# Patient Record
Sex: MALE | Race: WHITE | NOT HISPANIC OR LATINO | Employment: UNEMPLOYED | URBAN - NONMETROPOLITAN AREA
[De-identification: names, ages, dates, MRNs, and addresses within clinical notes are randomized per-mention and may not be internally consistent; named-entity substitution may affect disease eponyms.]

---

## 2024-04-10 ENCOUNTER — HOSPITAL ENCOUNTER (EMERGENCY)
Facility: HOSPITAL | Age: 39
Discharge: HOME | End: 2024-04-10
Attending: EMERGENCY MEDICINE | Admitting: EMERGENCY MEDICINE
Payer: COMMERCIAL

## 2024-04-10 VITALS
RESPIRATION RATE: 16 BRPM | HEART RATE: 99 BPM | DIASTOLIC BLOOD PRESSURE: 93 MMHG | WEIGHT: 190 LBS | TEMPERATURE: 98.6 F | OXYGEN SATURATION: 99 % | SYSTOLIC BLOOD PRESSURE: 169 MMHG

## 2024-04-10 DIAGNOSIS — K02.9 PAIN DUE TO DENTAL CARIES: ICD-10-CM

## 2024-04-10 DIAGNOSIS — K04.7 DENTAL INFECTION: Primary | ICD-10-CM

## 2024-04-10 RX ORDER — CHLORHEXIDINE GLUCONATE ORAL RINSE 1.2 MG/ML
15 SOLUTION DENTAL 2 TIMES DAILY
COMMUNITY
Start: 2024-04-08 | End: 2024-04-22

## 2024-04-10 RX ORDER — OXYCODONE HYDROCHLORIDE AND ACETAMINOPHEN 5; 325 MG/1; MG/1
1 TABLET ORAL EVERY 4 HOURS PRN
Qty: 10 TABLET | Refills: 0 | Status: SHIPPED | OUTPATIENT
Start: 2024-04-10

## 2024-04-10 RX ORDER — OXYCODONE HYDROCHLORIDE AND ACETAMINOPHEN 5; 325 MG/1; MG/1
1 TABLET ORAL ONCE
Status: COMPLETED | OUTPATIENT
Start: 2024-04-10 | End: 2024-04-10

## 2024-04-10 RX ORDER — CLINDAMYCIN HYDROCHLORIDE 150 MG/1
300 CAPSULE ORAL ONCE
Status: COMPLETED | OUTPATIENT
Start: 2024-04-10 | End: 2024-04-10

## 2024-04-10 RX ORDER — AMOXICILLIN 875 MG/1
875 TABLET, COATED ORAL 2 TIMES DAILY
COMMUNITY
Start: 2024-04-08 | End: 2024-04-18

## 2024-04-10 RX ORDER — CLINDAMYCIN HYDROCHLORIDE 300 MG/1
300 CAPSULE ORAL EVERY 6 HOURS
Qty: 40 CAPSULE | Refills: 0 | Status: SHIPPED | OUTPATIENT
Start: 2024-04-10 | End: 2024-04-20

## 2024-04-10 RX ADMIN — OXYCODONE HYDROCHLORIDE AND ACETAMINOPHEN 1 TABLET: 5; 325 TABLET ORAL at 22:15

## 2024-04-10 RX ADMIN — CLINDAMYCIN HYDROCHLORIDE 300 MG: 150 CAPSULE ORAL at 22:15

## 2024-04-11 NOTE — ED PROVIDER NOTES
"History  Chief Complaint   Patient presents with    Dental Pain     Patient presents to the ED with complaints of mouth pain since Saturday. He was evaluated at urgent care on Monday and was placed on antibiotics. The patient states he has bad teeth, and is drinking iced tea from a gallon stating \"I need something cold in my mouth or I'm in extreme pain.\"      Patient is a 38-year-old male presents the emergency department complaining of dental pain left lower jaw started about 5 days ago was seen at urgent care 2 days ago and prescribed antibiotics amoxicillin still having severe pain despite taking Tylenol Motrin.  No fevers.        History provided by:  Patient      Prior to Admission Medications   Prescriptions Last Dose Informant Patient Reported? Taking?   amoxicillin (AMOXIL) 875 mg tablet   Yes Yes   Sig: Take 875 mg by mouth 2 (two) times a day   chlorhexidine (PERIDEX) 0.12 % solution   Yes Yes   Sig: Apply 15 mL to the mouth or throat 2 (two) times a day      Facility-Administered Medications: None       History reviewed. No pertinent past medical history.    History reviewed. No pertinent surgical history.    History reviewed. No pertinent family history.  I have reviewed and agree with the history as documented.    E-Cigarette/Vaping    E-Cigarette Use Never User      E-Cigarette/Vaping Substances     Social History     Tobacco Use    Smoking status: Every Day     Current packs/day: 1.00     Types: Cigarettes    Smokeless tobacco: Never   Vaping Use    Vaping status: Never Used   Substance Use Topics    Alcohol use: Never    Drug use: Yes     Types: Marijuana       Review of Systems   Constitutional:  Negative for fever.   HENT:  Positive for dental problem. Negative for congestion.    Respiratory:  Negative for shortness of breath.    Cardiovascular:  Negative for chest pain.   Gastrointestinal:  Negative for nausea and vomiting.   Neurological:  Negative for headaches.   All other systems reviewed and " are negative.      Physical Exam  Physical Exam  Vitals and nursing note reviewed.   Constitutional:       General: He is not in acute distress.     Appearance: Normal appearance.   HENT:      Head: Normocephalic and atraumatic.      Nose: Nose normal.      Mouth/Throat:      Dentition: Abnormal dentition. Dental tenderness, gingival swelling and dental caries present.   Eyes:      Conjunctiva/sclera: Conjunctivae normal.   Pulmonary:      Effort: Pulmonary effort is normal. No respiratory distress.   Skin:     General: Skin is dry.   Neurological:      General: No focal deficit present.      Mental Status: He is alert and oriented to person, place, and time.         Vital Signs  ED Triage Vitals [04/10/24 2154]   Temperature Pulse Respirations Blood Pressure SpO2   98.6 °F (37 °C) 99 16 169/93 99 %      Temp Source Heart Rate Source Patient Position - Orthostatic VS BP Location FiO2 (%)   Temporal Monitor Sitting Right leg --      Pain Score       10 - Worst Possible Pain           Vitals:    04/10/24 2154   BP: 169/93   Pulse: 99   Patient Position - Orthostatic VS: Sitting         Visual Acuity      ED Medications  Medications   clindamycin (CLEOCIN) capsule 300 mg (300 mg Oral Given 4/10/24 2215)   oxyCODONE-acetaminophen (PERCOCET) 5-325 mg per tablet 1 tablet (1 tablet Oral Given 4/10/24 2215)       Diagnostic Studies  Results Reviewed       None                   No orders to display              Procedures  Procedures         ED Course                               SBIRT 20yo+      Flowsheet Row Most Recent Value   Initial Alcohol Screen: US AUDIT-C     1. How often do you have a drink containing alcohol? 0 Filed at: 04/10/2024 2153   2. How many drinks containing alcohol do you have on a typical day you are drinking?  0 Filed at: 04/10/2024 2153   3a. Male UNDER 65: How often do you have five or more drinks on one occasion? 0 Filed at: 04/10/2024 2153   Audit-C Score 0 Filed at: 04/10/2024 2153   BRITNI: How  many times in the past year have you...    Used an illegal drug or used a prescription medication for non-medical reasons? Never Filed at: 04/10/2024 2153                      Medical Decision Making  Patient is afebrile nontoxic well-appearing clinically and hemodynamically stable in the emergency department history and examination consistent with multiple infected dental caries and developing left lower dental abscess for now we will recommend stronger antibiotic with clindamycin discontinue amoxicillin and pain control and provided with multiple dental referrals advised prompt follow-up with dentist for further evaluation and definitive treatment. return precautions and anticipatory guidance discussed.      Problems Addressed:  Dental infection: acute illness or injury  Pain due to dental caries: acute illness or injury    Risk  Prescription drug management.             Disposition  Final diagnoses:   Dental infection   Pain due to dental caries     Time reflects when diagnosis was documented in both MDM as applicable and the Disposition within this note       Time User Action Codes Description Comment    4/10/2024 10:13 PM Alfredo Bañuelos Add [K04.7] Dental infection     4/10/2024 10:13 PM Alfredo Bañuelos Add [K02.9] Pain due to dental caries           ED Disposition       ED Disposition   Discharge    Condition   Stable    Date/Time   Wed Apr 10, 2024 2212    Comment   Faraz Vera discharge to home/self care.                   Follow-up Information       Follow up With Specialties Details Why Contact Info Additional Information    [facility]  Schedule an appointment as soon as possible for a visit in 1 day  [address] [facility] [address]  [phone]     [facility] Schedule an appointment as soon as possible for a visit in 1 day  [address] [facility] [address] [phone] [facility]  [facility], [address] [place] [address], [id number] [phone]    [facility]  Schedule an appointment as soon as possible for a visit in 1 day  [address] [place] [address]  [phone]     [facility]  Schedule an appointment as soon as possible for a visit in 1 day  [address] [place] [address] [address]  [facility] [address]  [phone]             Discharge Medication List as of 4/10/2024 10:17 PM        START taking these medications    Details   clindamycin (CLEOCIN) 300 MG capsule Take 1 capsule (300 mg total) by mouth every 6 (six) hours for 10 days, Starting Wed 4/10/2024, Until Sat 4/20/2024, Normal      oxyCODONE-acetaminophen (PERCOCET) 5-325 mg per tablet Take 1 tablet by mouth every 4 (four) hours as needed for moderate pain for up to 10 doses Max Daily Amount: 6 tablets, Starting Wed 4/10/2024, Normal           CONTINUE these medications which have NOT CHANGED    Details   amoxicillin (AMOXIL) 875 mg tablet Take 875 mg by mouth 2 (two) times a day, Starting Mon 4/8/2024, Until Thu 4/18/2024, Historical Med      chlorhexidine (PERIDEX) 0.12 % solution Apply 15 mL to the mouth or throat 2 (two) times a day, Starting Mon 4/8/2024, Until Mon 4/22/2024, Historical Med             No discharge procedures on file.    PDMP Review       None            ED Provider  Electronically Signed by             Alfredo Bañuelos DO  04/10/24 2998

## 2024-04-11 NOTE — DISCHARGE INSTRUCTIONS
Here is a list of  dental clinics that may be able to help you.  Keep in mind that these clinics do not have to see you or any other patient.  Also, these clinics are not connected to the [facility] [place] or [facility] but if they agree to see you as a patient it is easy for them to call  Medical Records Department to have your records faxed to them.      [facility]:  [address] [facility]   [address]   [facility] [address]   [phone]     [facility]:   [address]   [address]   [place], [place] [address] [phone]     [facility]:  [address] [place] [address] [phone]    [facility]:  [address] [facility] [place] [address] [phone]    [facility]:  [address],  [place] [address] [phone]    [facility]:  [address] [place] [address]  [phone]    [facility]:  [address] [place] [address]  [phone]    [facility]:  [address] [id number] [phone]    [facility]:  [address] [place], [place] [address] [phone]    [facility]  [address], [place] [address]  [phone]    [facility]:  [address] [place] [id number] [phone]     [facility]:  [address] [place] [address] [phone]

## 2025-04-17 ENCOUNTER — HOSPITAL ENCOUNTER (EMERGENCY)
Facility: HOSPITAL | Age: 40
Discharge: HOME | End: 2025-04-17
Attending: EMERGENCY MEDICINE
Payer: COMMERCIAL

## 2025-04-17 VITALS
TEMPERATURE: 97.5 F | HEART RATE: 79 BPM | RESPIRATION RATE: 18 BRPM | DIASTOLIC BLOOD PRESSURE: 79 MMHG | OXYGEN SATURATION: 97 % | WEIGHT: 185 LBS | BODY MASS INDEX: 24.52 KG/M2 | HEIGHT: 73 IN | SYSTOLIC BLOOD PRESSURE: 136 MMHG

## 2025-04-17 DIAGNOSIS — K04.7 DENTAL ABSCESS: Primary | ICD-10-CM

## 2025-04-17 PROCEDURE — 99282 EMERGENCY DEPT VISIT SF MDM: CPT

## 2025-04-17 RX ORDER — CLINDAMYCIN HYDROCHLORIDE 300 MG/1
300 CAPSULE ORAL 3 TIMES DAILY
COMMUNITY
Start: 2025-04-13 | End: 2025-04-20

## 2025-04-17 RX ORDER — CITALOPRAM HYDROBROMIDE 40 MG/1
1 TABLET ORAL EVERY MORNING
COMMUNITY
Start: 2024-10-23

## 2025-04-17 RX ADMIN — TOPICAL ANESTHETIC 2 SPRAY: 200 SPRAY DENTAL; PERIODONTAL at 14:41

## 2025-04-17 NOTE — ED PROVIDER NOTES
ED Disposition       None          Assessment & Plan   {Hyperlinks  Risk Stratification - NIHSS - HEART SCORE - Fill out sepsis note and make sure you call [phone] if severe or septic shock:[id number]}    Medical Decision Making  Risk  OTC drugs.             Medications - No data to display    ED Risk Strat Scores                    No data recorded        SBIRT 20yo+      Flowsheet Row Most Recent Value   Initial Alcohol Screen: US AUDIT-C     1. How often do you have a drink containing alcohol? 0 Filed at: 04/17/2025 1350   2. How many drinks containing alcohol do you have on a typical day you are drinking?  0 Filed at: 04/17/2025 1350   3a. Male UNDER 65: How often do you have five or more drinks on one occasion? 0 Filed at: 04/17/2025 1350   Audit-C Score 0 Filed at: 04/17/2025 1350   BRITNI: How many times in the past year have you...    Used an illegal drug or used a prescription medication for non-medical reasons? Never Filed at: 04/17/2025 1350                            History of Present Illness   {Hyperlinks  History (Med, Surg, Fam, Social) - Current Medications - Allergies  :[id number]}    Chief Complaint   Patient presents with   • Dental Pain     Patient states that he was seen at  on Sunday and prescribed Clindamycin. States he feels that the abscess is getting bigger. Was seen by dentist yesterday and told to continue regimen. Waiting for insurance to approve for teeth to be pulled asap.        History reviewed. No pertinent past medical history.   History reviewed. No pertinent surgical history.   History reviewed. No pertinent family history.   Social History     Tobacco Use   • Smoking status: Every Day     Current packs/day: 1.00     Types: Cigarettes   • Smokeless tobacco: Never   Vaping Use   • Vaping status: Never Used   Substance Use Topics   • Alcohol use: Never   • Drug use: Yes     Types: Marijuana      E-Cigarette/Vaping   • E-Cigarette Use Never User       E-Cigarette/Vaping Substances       I have reviewed and agree with the history as documented.     HPI    Review of Systems        Objective   {Hyperlinks  Historical Vitals - Historical Labs - Chart Review/Microbiology - Last Echo - Code Status  :[id number]}    ED Triage Vitals [04/17/25 1344]   Temperature Pulse Blood Pressure Respirations SpO2 Patient Position - Orthostatic VS   97.5 °F (36.4 °C) 89 (!) 151/118 16 100 % Sitting      Temp Source Heart Rate Source BP Location FiO2 (%) Pain Score    Temporal Monitor Left arm -- No Pain      Vitals      Date and Time Temp Pulse SpO2 Resp BP Pain Score FACES Pain Rating User   04/17/25 1344 97.5 °F (36.4 °C) 89 100 % 16 151/118 No Pain -- SBE            Physical Exam    Results Reviewed       None            No orders to display       Incision and drain    Date/Time: 4/17/2025 2:54 PM    Performed by: Deonte Castañeda MD  Authorized by: Deonte Castañeda MD        ED Medication and Procedure Management   Prior to Admission Medications   Prescriptions Last Dose Informant Patient Reported? Taking?   citalopram (CeleXA) 40 mg tablet   Yes Yes   Sig: Take 1 tablet by mouth every morning   clindamycin (CLEOCIN) 300 MG capsule   Yes Yes   Sig: Take 300 mg by mouth Three times a day   oxyCODONE-acetaminophen (PERCOCET) 5-325 mg per tablet   No Yes   Sig: Take 1 tablet by mouth every 4 (four) hours as needed for moderate pain for up to 10 doses Max Daily Amount: 6 tablets      Facility-Administered Medications: None     Patient's Medications   Discharge Prescriptions    No medications on file     No discharge procedures on file.  ED SEPSIS DOCUMENTATION

## 2025-04-17 NOTE — DISCHARGE INSTRUCTIONS
Continue antibiotics as previously prescribed until gone.  Take ibuprofen as needed for pain.  Rinse regularly with salt water or mouthwash.  Monitor for signs of worsening infection.  Follow-up with dentist as planned.  Return to the ED for any new or worsening symptoms or concerns.

## 2025-04-17 NOTE — ED PROVIDER NOTES
Time reflects when diagnosis was documented in both MDM as applicable and the Disposition within this note       Time User Action Codes Description Comment    4/17/2025  3:11 PM Deonte Castañeda Add [K04.7] Dental abscess           ED Disposition       ED Disposition   Discharge    Condition   Stable    Date/Time   Thu Apr 17, 2025  3:11 PM    Comment   Kirk Tassone discharge to home/self care.                   Assessment & Plan       Medical Decision Making  Patient was seen and evaluated.  Patient at risk for antibiotic failure, dental abscess, airway compromise, Johnathan's angina, systemic infection, other.  Mild discomfort, but overall well-appearing.  Airway patent and protected.  Vital signs within acceptable limits.  Dental abscess noted at the buccal gumline on the maxilla.  Small abscess aspirated successfully in the ED with improvement in patient's pain.  Advised to continue antibiotics until gone, continue ibuprofen as needed, salt water or mouthwash rinses.  Recommended dental follow-up as planned.  Stable for discharge from the emergency department.  Strict return precautions reviewed.  All questions answered.    Risk  OTC drugs.             Medications   benzocaine (HURRICAINE) 20 % mucosal spray 2 spray (2 sprays Mucosal Given 4/17/25 1441)       ED Risk Strat Scores                    No data recorded        SBIRT 20yo+      Flowsheet Row Most Recent Value   Initial Alcohol Screen: US AUDIT-C     1. How often do you have a drink containing alcohol? 0 Filed at: 04/17/2025 1350   2. How many drinks containing alcohol do you have on a typical day you are drinking?  0 Filed at: 04/17/2025 1350   3a. Male UNDER 65: How often do you have five or more drinks on one occasion? 0 Filed at: 04/17/2025 1350   Audit-C Score 0 Filed at: 04/17/2025 1350   BRITNI: How many times in the past year have you...    Used an illegal drug or used a prescription medication for non-medical reasons? Never Filed at: 04/17/2025  1350                            History of Present Illness       Chief Complaint   Patient presents with    Dental Pain     Patient states that he was seen at  on Sunday and prescribed Clindamycin. States he feels that the abscess is getting bigger. Was seen by dentist yesterday and told to continue regimen. Waiting for insurance to approve for teeth to be pulled asap.        History reviewed. No pertinent past medical history.   History reviewed. No pertinent surgical history.   History reviewed. No pertinent family history.   Social History     Tobacco Use    Smoking status: Every Day     Current packs/day: 1.00     Types: Cigarettes    Smokeless tobacco: Never   Vaping Use    Vaping status: Never Used   Substance Use Topics    Alcohol use: Never    Drug use: Yes     Types: Marijuana      E-Cigarette/Vaping    E-Cigarette Use Never User       E-Cigarette/Vaping Substances      I have reviewed and agree with the history as documented.     Patient presents to the emergency department for evaluation of dental pain in the right upper maxilla.  Has known dental infection.  Was seen at urgent care a few days ago, prescribed clindamycin 300 mg 3 times daily.  Has been taking it.  Complains of worsening pain not relieved with ibuprofen.  Denies any difficulty swallowing.  No fevers.  Rates pain at 5 out of 10.  Has been seen by the dentist.  Awaiting insurance approval for tooth extraction.  No nausea or vomiting.  No other complaints, modifying factors, or associated symptoms.        Review of Systems   All other systems reviewed and are negative.          Objective       ED Triage Vitals [04/17/25 1344]   Temperature Pulse Blood Pressure Respirations SpO2 Patient Position - Orthostatic VS   97.5 °F (36.4 °C) 89 (!) 151/118 16 100 % Sitting      Temp Source Heart Rate Source BP Location FiO2 (%) Pain Score    Temporal Monitor Left arm -- No Pain      Vitals      Date and Time Temp Pulse SpO2 Resp BP Pain Score FACES  Pain Rating User   04/17/25 1500 -- 79 97 % 18 136/79 -- -- MB   04/17/25 1430 -- 82 96 % 18 133/91 -- -- MB   04/17/25 1400 -- 79 97 % 18 139/94 -- -- MB   04/17/25 1344 97.5 °F (36.4 °C) 89 100 % 16 151/118 No Pain -- SBE            Physical Exam  Vitals and nursing note reviewed.   Constitutional:       General: He is not in acute distress.     Appearance: He is well-developed. He is not ill-appearing or toxic-appearing.      Comments: Mild discomfort.   HENT:      Head: Normocephalic and atraumatic.      Nose: Nose normal.      Mouth/Throat:      Comments: Poor dentition.  1 cm abscess noted to the right upper gumline, buccal surface, pointing, tender, fluctuant.  Airway patent and protected.  Eyes:      Conjunctiva/sclera: Conjunctivae normal.   Cardiovascular:      Rate and Rhythm: Normal rate.   Pulmonary:      Effort: Pulmonary effort is normal. No respiratory distress.   Abdominal:      General: There is no distension.   Musculoskeletal:         General: No swelling.      Cervical back: Neck supple.   Skin:     General: Skin is warm and dry.      Capillary Refill: Capillary refill takes less than 2 seconds.   Neurological:      General: No focal deficit present.      Mental Status: He is alert and oriented to person, place, and time.   Psychiatric:         Mood and Affect: Mood normal.         Behavior: Behavior normal.         Results Reviewed       None            No orders to display       Incision and drain    Date/Time: 4/17/2025 2:40 PM    Performed by: Deonte Castañeda MD  Authorized by: Deonte Castañeda MD  Universal Protocol:  procedure performed by consultantConsent: Verbal consent obtained.  Patient understanding: patient states understanding of the procedure being performed    Patient location:  ED  Location:     Type:  Abscess    Location:  Mouth    Location Detail:  Intraoral    Mouth location: Right upper maxillary dental abscess, buccal surface.  Anesthesia (see MAR for exact  dosages):     Anesthesia method:  Topical application    Topical anesthesia: HurriCaine spray.  Procedure details:     Complexity:  Simple    Needle aspiration: yes      Needle size:  18 G    Aspiration type: puncture aspiration      Drainage:  Bloody and purulent    Drainage amount:  Scant (1.5 ml)  Post-procedure details:     Patient tolerance of procedure:  Tolerated well, no immediate complications      ED Medication and Procedure Management   Prior to Admission Medications   Prescriptions Last Dose Informant Patient Reported? Taking?   citalopram (CeleXA) 40 mg tablet   Yes Yes   Sig: Take 1 tablet by mouth every morning   clindamycin (CLEOCIN) 300 MG capsule   Yes Yes   Sig: Take 300 mg by mouth Three times a day   oxyCODONE-acetaminophen (PERCOCET) 5-325 mg per tablet   No Yes   Sig: Take 1 tablet by mouth every 4 (four) hours as needed for moderate pain for up to 10 doses Max Daily Amount: 6 tablets      Facility-Administered Medications: None     Patient's Medications   Discharge Prescriptions    No medications on file     No discharge procedures on file.  ED SEPSIS DOCUMENTATION   Time reflects when diagnosis was documented in both MDM as applicable and the Disposition within this note       Time User Action Codes Description Comment    4/17/2025  3:11 PM Deonte Castañeda Add [K04.7] Dental abscess                  Deonte Castañeda MD  04/17/25 1513